# Patient Record
Sex: FEMALE | Race: WHITE | URBAN - METROPOLITAN AREA
[De-identification: names, ages, dates, MRNs, and addresses within clinical notes are randomized per-mention and may not be internally consistent; named-entity substitution may affect disease eponyms.]

---
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
## 2023-11-03 NOTE — PROCEDURE: COUNSELING
Detail Level: Detailed Detail Level: Simple Information: This plan will allow you to select a body location and will not render the procedure on the note output. It will note the location you select in the morphology.